# Patient Record
Sex: FEMALE | Race: WHITE | NOT HISPANIC OR LATINO | Employment: FULL TIME | URBAN - NONMETROPOLITAN AREA
[De-identification: names, ages, dates, MRNs, and addresses within clinical notes are randomized per-mention and may not be internally consistent; named-entity substitution may affect disease eponyms.]

---

## 2017-06-15 ENCOUNTER — OFFICE VISIT (OUTPATIENT)
Dept: CARDIOLOGY | Facility: CLINIC | Age: 42
End: 2017-06-15

## 2017-06-15 VITALS
HEIGHT: 67 IN | HEART RATE: 91 BPM | DIASTOLIC BLOOD PRESSURE: 80 MMHG | BODY MASS INDEX: 32.8 KG/M2 | WEIGHT: 209 LBS | SYSTOLIC BLOOD PRESSURE: 110 MMHG | OXYGEN SATURATION: 99 %

## 2017-06-15 DIAGNOSIS — R07.89 OTHER CHEST PAIN: Primary | ICD-10-CM

## 2017-06-15 DIAGNOSIS — R94.39 ABNORMAL STRESS ECG WITH TREADMILL: ICD-10-CM

## 2017-06-15 DIAGNOSIS — E66.09 NON MORBID OBESITY DUE TO EXCESS CALORIES: ICD-10-CM

## 2017-06-15 DIAGNOSIS — I10 ESSENTIAL HYPERTENSION: ICD-10-CM

## 2017-06-15 PROBLEM — K21.9 GERD (GASTROESOPHAGEAL REFLUX DISEASE): Status: ACTIVE | Noted: 2017-06-15

## 2017-06-15 PROBLEM — M06.9 RHEUMATOID ARTHRITIS (HCC): Status: ACTIVE | Noted: 2017-06-15

## 2017-06-15 PROCEDURE — 99204 OFFICE O/P NEW MOD 45 MIN: CPT | Performed by: INTERNAL MEDICINE

## 2017-06-15 PROCEDURE — 93000 ELECTROCARDIOGRAM COMPLETE: CPT | Performed by: INTERNAL MEDICINE

## 2017-06-15 RX ORDER — LISINOPRIL 5 MG/1
5 TABLET ORAL DAILY
Refills: 5 | COMMUNITY
Start: 2017-06-05

## 2017-06-15 RX ORDER — FOLIC ACID 1 MG/1
1 TABLET ORAL DAILY
Refills: 5 | COMMUNITY
Start: 2017-05-31

## 2017-06-15 RX ORDER — HYDROCHLOROTHIAZIDE 25 MG/1
25 TABLET ORAL DAILY
Refills: 5 | COMMUNITY
Start: 2017-05-23

## 2017-06-15 RX ORDER — PANTOPRAZOLE SODIUM 40 MG/1
40 TABLET, DELAYED RELEASE ORAL DAILY
Refills: 2 | COMMUNITY
Start: 2017-05-24

## 2017-06-15 RX ORDER — CETIRIZINE HYDROCHLORIDE 10 MG/1
10 TABLET ORAL DAILY
COMMUNITY

## 2017-06-15 NOTE — PROGRESS NOTES
Reason for Visit: chest pain.    HPI:  Sharon Coto is a 41 y.o. female is being seen for consultation today at the request of Dr Patterson.  She recently had an EKG treadmill stress test in May at her primary care office in which he developed chest pain and had upsloping ST depression.  She has been having intermittent chest pain over the past 2 months.  It typically starts under her left armpit and radiates across her chest.  Will last for a couple of minutes typically.  It is very intermittent, happens about 2 to 3 times per week, both at rest and with stress.      Previous Cardiac Testing and Procedures:  - Exercise stress test (05/30/2017) chest pain with exercise, upsloping inferior lateral ST depression    Patient Active Problem List   Diagnosis   • Chest pain   • Dizziness   • Hypertension   • GERD (gastroesophageal reflux disease)   • Rheumatoid arthritis       Social History   Substance Use Topics   • Smoking status: Never Smoker   • Smokeless tobacco: Never Used   • Alcohol use No       Family History   Problem Relation Age of Onset   • Diabetes Mother    • Hypertension Mother    • Hyperlipidemia Mother    • Hypertension Father        The following portions of the patient's history were reviewed and updated as appropriate: allergies, current medications, past family history, past medical history, past social history, past surgical history and problem list.      Current Outpatient Prescriptions:   •  calcium carb-cholecalciferol (CALCIUM + D3) 600-800 MG-UNIT tablet, Take 1 tablet by mouth Daily., Disp: , Rfl:   •  cetirizine (zyrTEC) 10 MG tablet, Take 10 mg by mouth Daily., Disp: , Rfl:   •  folic acid (FOLVITE) 1 MG tablet, Take 1 mg by mouth Daily., Disp: , Rfl: 5  •  hydrochlorothiazide (HYDRODIURIL) 25 MG tablet, Take 25 mg by mouth Daily., Disp: , Rfl: 5  •  lisinopril (PRINIVIL,ZESTRIL) 5 MG tablet, Take 5 mg by mouth Daily., Disp: , Rfl: 5  •  methotrexate 2.5 MG tablet, Take 2.5 mg by mouth Take As  "Directed. 8 tablets weekly, Disp: , Rfl: 1  •  ONE TOUCH ULTRA TEST test strip, USE 1 STRIP DAILY, Disp: , Rfl: 0  •  pantoprazole (PROTONIX) 40 MG EC tablet, Take 40 mg by mouth Daily., Disp: , Rfl: 2  •  SPRINTEC 28 0.25-35 MG-MCG per tablet, Take 0.25-35 tablets by mouth Daily., Disp: , Rfl: 11    Review of Systems   Constitution: Positive for chills and malaise/fatigue. Negative for decreased appetite and fever.   HENT: Positive for headaches. Negative for congestion and nosebleeds.    Eyes: Negative for blurred vision and double vision.   Cardiovascular: Positive for chest pain. Negative for irregular heartbeat, leg swelling and palpitations.   Respiratory: Positive for cough and shortness of breath.    Endocrine: Negative for cold intolerance and heat intolerance.   Hematologic/Lymphatic: Does not bruise/bleed easily.   Skin: Negative for dry skin, itching and rash.   Musculoskeletal: Positive for joint pain and muscle cramps. Negative for back pain and neck pain.   Gastrointestinal: Negative for abdominal pain, constipation, diarrhea, heartburn and melena.   Genitourinary: Negative for dysuria, frequency and hematuria.   Neurological: Positive for dizziness. Negative for light-headedness, loss of balance and numbness.   Psychiatric/Behavioral: Negative for depression. The patient has insomnia. The patient is not nervous/anxious.        Objective   /80 (BP Location: Left arm, Patient Position: Sitting, Cuff Size: Adult)  Pulse 91  Ht 67\" (170.2 cm)  Wt 209 lb (94.8 kg)  SpO2 99%  BMI 32.73 kg/m2  Physical Exam   Constitutional: She is oriented to person, place, and time. She appears well-developed and well-nourished.   HENT:   Head: Normocephalic and atraumatic.   Eyes: Conjunctivae and EOM are normal. Pupils are equal, round, and reactive to light.   Neck: Normal range of motion. Neck supple. No JVD present. No thyromegaly present.   Cardiovascular: Normal rate, regular rhythm and normal heart " sounds.    No murmur heard.  Pulmonary/Chest: Effort normal and breath sounds normal. She has no wheezes. She has no rales.   Abdominal: Soft. Bowel sounds are normal. She exhibits no distension. There is no tenderness.   Musculoskeletal: Normal range of motion. She exhibits no edema.   Neurological: She is alert and oriented to person, place, and time. Coordination normal.   Skin: Skin is warm and dry. No rash noted.   Psychiatric: She has a normal mood and affect. Her behavior is normal.       ECG 12 Lead  Date/Time: 6/15/2017 3:33 PM  Performed by: REGINALDO DELEON  Authorized by: REGINALDO DELEON   Comparison: compared with previous ECG from 5/30/2017  Similar to previous ECG  Rhythm: sinus rhythm  Rate: normal  Clinical impression: normal ECG              ICD-10-CM ICD-9-CM   1. Other chest pain R07.89 786.59   2. Essential hypertension I10 401.9   3. Non morbid obesity due to excess calories E66.09 278.00   4. Abnormal stress ECG with treadmill R94.39 794.39         Assessment/Plan:  1. Chest pain:  Symptoms are somewhat atypical.  Recent abnormal EKG treadmill stress test.    2.  Abnormal stress test: EKG treadmill stress test with chest pain and upsloping ST depression.  I rate of false positive stress test particularly in women.  Will evaluate further with a stress test with imaging.  Check a lipid panel.    3.  Hypertension: Blood pressure is well controlled on current medications.    4.  Obesity: BMI 33,  on diet, excise, weight loss.

## 2017-07-05 ENCOUNTER — OUTSIDE FACILITY SERVICE (OUTPATIENT)
Dept: CARDIOLOGY | Facility: CLINIC | Age: 42
End: 2017-07-05

## 2017-07-05 PROCEDURE — 93018 CV STRESS TEST I&R ONLY: CPT | Performed by: INTERNAL MEDICINE

## 2017-07-05 PROCEDURE — 93350 STRESS TTE ONLY: CPT | Performed by: INTERNAL MEDICINE

## 2017-07-13 ENCOUNTER — OFFICE VISIT (OUTPATIENT)
Dept: CARDIOLOGY | Facility: CLINIC | Age: 42
End: 2017-07-13

## 2017-07-13 VITALS
OXYGEN SATURATION: 99 % | HEIGHT: 67 IN | BODY MASS INDEX: 33.9 KG/M2 | WEIGHT: 216 LBS | SYSTOLIC BLOOD PRESSURE: 110 MMHG | DIASTOLIC BLOOD PRESSURE: 60 MMHG | HEART RATE: 90 BPM

## 2017-07-13 DIAGNOSIS — R07.89 OTHER CHEST PAIN: Primary | ICD-10-CM

## 2017-07-13 DIAGNOSIS — I10 ESSENTIAL HYPERTENSION: ICD-10-CM

## 2017-07-13 DIAGNOSIS — E66.09 NON MORBID OBESITY DUE TO EXCESS CALORIES: ICD-10-CM

## 2017-07-13 DIAGNOSIS — R94.39 ABNORMAL STRESS TEST: ICD-10-CM

## 2017-07-13 PROCEDURE — 99213 OFFICE O/P EST LOW 20 MIN: CPT | Performed by: INTERNAL MEDICINE

## 2017-07-13 NOTE — PROGRESS NOTES
Reason for Visit: cardiovascular follow up.    HPI:  Sharon Coto is a 41 y.o. female is here today for 4 week follow-up.  She was last seen for evaluation of an abnormal EKG treadmill stress.  Stress echo was ordered for further evaluation and was negative for ischemia.  She has had several brief episodes since her last visit of chest pain.  It is brief lasting seconds.  Sometimes worsens with arm movement.    Previous Cardiac Testing and Procedures:  - Exercise stress test (05/30/2017) chest pain with exercise, upsloping inferior lateral ST depression  - Stress echo (07/05/2017) low risk for ischemia  - Lipid panel (07/05/2017) total cholesterol 156, HDL 51, LDL 64, triglycerides 205    Patient Active Problem List   Diagnosis   • Chest pain   • Dizziness   • Hypertension   • GERD (gastroesophageal reflux disease)   • Rheumatoid arthritis   • Non morbid obesity due to excess calories       Social History   Substance Use Topics   • Smoking status: Never Smoker   • Smokeless tobacco: Never Used   • Alcohol use No       Family History   Problem Relation Age of Onset   • Diabetes Mother    • Hypertension Mother    • Hyperlipidemia Mother    • Hypertension Father        The following portions of the patient's history were reviewed and updated as appropriate: allergies, current medications, past family history, past medical history, past social history, past surgical history and problem list.      Current Outpatient Prescriptions:   •  calcium carb-cholecalciferol (CALCIUM + D3) 600-800 MG-UNIT tablet, Take 1 tablet by mouth Daily., Disp: , Rfl:   •  cetirizine (zyrTEC) 10 MG tablet, Take 10 mg by mouth Daily., Disp: , Rfl:   •  folic acid (FOLVITE) 1 MG tablet, Take 1 mg by mouth Daily., Disp: , Rfl: 5  •  hydrochlorothiazide (HYDRODIURIL) 25 MG tablet, Take 25 mg by mouth Daily., Disp: , Rfl: 5  •  lisinopril (PRINIVIL,ZESTRIL) 5 MG tablet, Take 5 mg by mouth Daily., Disp: , Rfl: 5  •  metFORMIN (GLUCOPHAGE) 500 MG  "tablet, Take 500 mg by mouth Daily., Disp: , Rfl: 2  •  methotrexate 2.5 MG tablet, Take 2.5 mg by mouth Take As Directed. 8 tablets weekly, Disp: , Rfl: 1  •  ONE TOUCH ULTRA TEST test strip, USE 1 STRIP DAILY, Disp: , Rfl: 0  •  pantoprazole (PROTONIX) 40 MG EC tablet, Take 40 mg by mouth Daily., Disp: , Rfl: 2  •  SPRINTEC 28 0.25-35 MG-MCG per tablet, Take 0.25-35 tablets by mouth Daily., Disp: , Rfl: 11    Review of Systems   Constitution: Positive for chills and malaise/fatigue. Negative for decreased appetite and fever.   HENT: Positive for headaches. Negative for congestion and nosebleeds.    Eyes: Negative for blurred vision and double vision.   Cardiovascular: Positive for chest pain. Negative for irregular heartbeat, leg swelling and palpitations.   Respiratory: Positive for cough. Negative for shortness of breath.    Endocrine: Negative for cold intolerance and heat intolerance.   Hematologic/Lymphatic: Does not bruise/bleed easily.   Skin: Negative for dry skin, itching and rash.   Musculoskeletal: Positive for joint pain and muscle cramps. Negative for back pain and neck pain.   Gastrointestinal: Negative for abdominal pain, constipation, diarrhea, heartburn and melena.   Genitourinary: Negative for dysuria, frequency and hematuria.   Neurological: Negative for dizziness, light-headedness, loss of balance and numbness.   Psychiatric/Behavioral: Negative for depression. The patient has insomnia. The patient is not nervous/anxious.        Objective   /60 (BP Location: Left arm, Patient Position: Sitting, Cuff Size: Large Adult)  Pulse 90  Ht 67\" (170.2 cm)  Wt 216 lb (98 kg)  SpO2 99%  BMI 33.83 kg/m2  Physical Exam   Constitutional: She is oriented to person, place, and time. She appears well-developed and well-nourished.   HENT:   Head: Normocephalic and atraumatic.   Cardiovascular: Normal rate, regular rhythm and normal heart sounds.    No murmur heard.  Pulmonary/Chest: Effort normal and " breath sounds normal.   Musculoskeletal: She exhibits no edema.   Neurological: She is alert and oriented to person, place, and time.   Skin: Skin is warm and dry.   Psychiatric: She has a normal mood and affect.     Procedures      ICD-10-CM ICD-9-CM   1. Other chest pain R07.89 786.59   2. Abnormal stress test R94.39 794.39   3. Essential hypertension I10 401.9   4. Non morbid obesity due to excess calories E66.09 278.00         Assessment/Plan:  1. Chest pain: Atypical symptoms and consistent with noncardiac etiology.      2. Abnormal stress test: EKG treadmill stress test false positive.  Stress echo negative for ischemia.       3. Hypertension: Blood pressure remains well controlled on current medications.     4. Obesity: BMI 33, continue to  on lifestyle modification and weight loss.

## 2017-07-19 ENCOUNTER — TELEPHONE (OUTPATIENT)
Dept: CARDIOLOGY | Facility: CLINIC | Age: 42
End: 2017-07-19

## 2017-07-19 NOTE — TELEPHONE ENCOUNTER
----- Message from Jalen Villareal MD sent at 7/13/2017 12:55 PM CDT -----  Please let her know that her stress test showed deconditioning and below average functional capacity.  There was no evidence of blockages.      Called [facility] that I would call her back in am,

## 2018-03-30 ENCOUNTER — HOSPITAL ENCOUNTER (OUTPATIENT)
Dept: GENERAL RADIOLOGY | Age: 43
Discharge: HOME | End: 2018-03-30

## 2018-03-30 DIAGNOSIS — M06.9 RHEUMATOID ARTHRITIS, INVOLVING UNSPECIFIED SITE, UNSPECIFIED RHEUMATOID FACTOR PRESENCE: ICD-10-CM

## 2018-03-30 PROCEDURE — 71046 X-RAY EXAM CHEST 2 VIEWS: CPT
